# Patient Record
Sex: FEMALE | Race: WHITE | NOT HISPANIC OR LATINO | Employment: FULL TIME
[De-identification: names, ages, dates, MRNs, and addresses within clinical notes are randomized per-mention and may not be internally consistent; named-entity substitution may affect disease eponyms.]

---

## 2017-06-10 ENCOUNTER — HEALTH MAINTENANCE LETTER (OUTPATIENT)
Age: 62
End: 2017-06-10

## 2017-10-20 ENCOUNTER — HOSPITAL ENCOUNTER (OUTPATIENT)
Dept: MAMMOGRAPHY | Facility: CLINIC | Age: 62
Discharge: HOME | End: 2017-10-20
Attending: FAMILY MEDICINE | Admitting: FAMILY MEDICINE
Payer: COMMERCIAL

## 2017-10-20 DIAGNOSIS — Z12.31 VISIT FOR SCREENING MAMMOGRAM: ICD-10-CM

## 2017-10-20 PROCEDURE — G0202 SCR MAMMO BI INCL CAD: HCPCS

## 2018-11-14 ENCOUNTER — HOSPITAL ENCOUNTER (OUTPATIENT)
Dept: MAMMOGRAPHY | Facility: CLINIC | Age: 63
Discharge: HOME | End: 2018-11-14
Attending: FAMILY MEDICINE | Admitting: FAMILY MEDICINE
Payer: COMMERCIAL

## 2018-11-14 DIAGNOSIS — Z12.31 VISIT FOR SCREENING MAMMOGRAM: ICD-10-CM

## 2018-11-14 PROCEDURE — 77063 BREAST TOMOSYNTHESIS BI: CPT

## 2019-11-26 ENCOUNTER — HOSPITAL ENCOUNTER (OUTPATIENT)
Dept: MAMMOGRAPHY | Facility: CLINIC | Age: 64
Discharge: HOME | End: 2019-11-26
Attending: FAMILY MEDICINE | Admitting: FAMILY MEDICINE
Payer: COMMERCIAL

## 2019-11-26 DIAGNOSIS — Z12.31 OTHER SCREENING MAMMOGRAM: ICD-10-CM

## 2019-11-26 PROCEDURE — 77063 BREAST TOMOSYNTHESIS BI: CPT

## 2020-11-18 ENCOUNTER — HOSPITAL ENCOUNTER (OUTPATIENT)
Dept: MAMMOGRAPHY | Facility: CLINIC | Age: 65
Discharge: HOME | End: 2020-11-18
Attending: FAMILY MEDICINE | Admitting: FAMILY MEDICINE
Payer: COMMERCIAL

## 2020-11-18 DIAGNOSIS — Z12.31 VISIT FOR SCREENING MAMMOGRAM: ICD-10-CM

## 2020-11-18 PROCEDURE — 77067 SCR MAMMO BI INCL CAD: CPT

## 2021-01-15 ENCOUNTER — HEALTH MAINTENANCE LETTER (OUTPATIENT)
Age: 66
End: 2021-01-15

## 2021-04-16 ENCOUNTER — TRANSFERRED RECORDS (OUTPATIENT)
Dept: HEALTH INFORMATION MANAGEMENT | Facility: CLINIC | Age: 66
End: 2021-04-16

## 2021-04-16 LAB — HBA1C MFR BLD: 6.2 % (ref 4.8–5.6)

## 2021-05-09 ENCOUNTER — HEALTH MAINTENANCE LETTER (OUTPATIENT)
Age: 66
End: 2021-05-09

## 2021-05-11 ENCOUNTER — OFFICE VISIT (OUTPATIENT)
Dept: PHARMACY | Facility: PHYSICIAN GROUP | Age: 66
End: 2021-05-11
Payer: COMMERCIAL

## 2021-05-11 DIAGNOSIS — H04.123 DRY EYES: ICD-10-CM

## 2021-05-11 DIAGNOSIS — J30.9 ALLERGIC RHINITIS, UNSPECIFIED SEASONALITY, UNSPECIFIED TRIGGER: ICD-10-CM

## 2021-05-11 DIAGNOSIS — M85.80 OSTEOPENIA, UNSPECIFIED LOCATION: ICD-10-CM

## 2021-05-11 DIAGNOSIS — Z78.9 TAKES DIETARY SUPPLEMENTS: ICD-10-CM

## 2021-05-11 DIAGNOSIS — G43.709 CHRONIC MIGRAINE WITHOUT AURA WITHOUT STATUS MIGRAINOSUS, NOT INTRACTABLE: ICD-10-CM

## 2021-05-11 DIAGNOSIS — E11.9 TYPE 2 DIABETES MELLITUS WITHOUT COMPLICATION, WITHOUT LONG-TERM CURRENT USE OF INSULIN (H): Primary | ICD-10-CM

## 2021-05-11 DIAGNOSIS — E78.5 DYSLIPIDEMIA: ICD-10-CM

## 2021-05-11 DIAGNOSIS — F34.1 DYSTHYMIC DISORDER: ICD-10-CM

## 2021-05-11 PROCEDURE — 99605 MTMS BY PHARM NP 15 MIN: CPT | Performed by: PHARMACIST

## 2021-05-11 PROCEDURE — 99607 MTMS BY PHARM ADDL 15 MIN: CPT | Performed by: PHARMACIST

## 2021-05-11 RX ORDER — CHLORAL HYDRATE 500 MG
1 CAPSULE ORAL DAILY
COMMUNITY

## 2021-05-11 RX ORDER — ATORVASTATIN CALCIUM 10 MG/1
10 TABLET, FILM COATED ORAL DAILY
COMMUNITY

## 2021-05-11 RX ORDER — FEXOFENADINE HCL AND PSEUDOEPHEDRINE HCI 60; 120 MG/1; MG/1
1 TABLET, EXTENDED RELEASE ORAL DAILY PRN
COMMUNITY

## 2021-05-11 RX ORDER — MULTIVIT-MIN/IRON/FOLIC ACID/K 18-600-40
1 CAPSULE ORAL DAILY
COMMUNITY

## 2021-05-11 RX ORDER — ENALAPRIL MALEATE 2.5 MG/1
2.5 TABLET ORAL DAILY
COMMUNITY

## 2021-05-11 RX ORDER — FLASH GLUCOSE SENSOR
KIT MISCELLANEOUS
COMMUNITY

## 2021-05-11 RX ORDER — BLOOD-GLUCOSE METER
EACH MISCELLANEOUS
COMMUNITY

## 2021-05-11 RX ORDER — ASPIRIN 81 MG/1
81 TABLET ORAL DAILY
COMMUNITY

## 2021-05-11 RX ORDER — AZELASTINE 1 MG/ML
1 SPRAY, METERED NASAL 2 TIMES DAILY PRN
COMMUNITY

## 2021-05-11 RX ORDER — SERTRALINE HYDROCHLORIDE 100 MG/1
100 TABLET, FILM COATED ORAL DAILY
COMMUNITY

## 2021-05-11 ASSESSMENT — MIFFLIN-ST. JEOR: SCORE: 1011.97

## 2021-05-11 NOTE — PROGRESS NOTES
"Medication Therapy Management (MTM) Encounter    ASSESSMENT:                            Medication Adherence/Access: {adherencechoices:840247}    ***: ***  ***: ***  ***: ***  ***: ***    PLAN:                            1. Stop vitamin D.     Follow-up: No follow-ups on file.      SUBJECTIVE/OBJECTIVE:                          Tabitha Nieves is a 66 year old female { :864058} for {mtmvisit:365846}     Reason for visit: ***.    Allergies/ADRs: {1/2/3/4/5:351247}  Tobacco: She has no history on file for tobacco.  Alcohol: {ALCOHOL CONSUMPTION HX:698843}  Caffeine: {CAFFEINE INTAKE:224218}  Activity: ***  Past Medical History: {1/2/3/4/5:506197}  {Social and Goals:632343}    Medication Adherence/Access: {fumedadherence:653055}    Type 2 Diabetes:  Currently taking metformin 500mg daily. {sideeffects:906425}  Blood sugar monitoring: {MTM self monitorin}. Ranges {Pt report:109363}: {bgranges:957993}  AM fastin,  93, 101, 103, 90, 103, 91, 100, 113, 100, 96, 105, 101, 107, 99.   Before dinner: 104, 143, 125, 135, 111, 161 (brownie)  Symptoms of low blood sugar? {HYPOGLYCEMIA SYMPTOMS:903110::\"none\"}  Symptoms of high blood sugar? {diabetessymptoms:974375}  Eye exam: {up to date:878218}  Foot exam: {up to date:349779}  Diet/Exercise: ***  Aspirin: {ASAyesno:231305}  Statin: {YES (EXPLAIN)/NO:273423}   ACEi/ARB: {YES (EXPLAIN)/NO:273097}.   Urine Albumin: No results found for: UMALCR   No results found for: A1C  {IMMUNIZATION REMINDER LOOK IN NAVIGATOR:557420}    Hyperlipidemia: Current therapy includes {LIPID TREATMENT:161470}.  Patient reports {mtmlipidsideeffect:882662}  {lipidascvd:985859}  No results for input(s): CHOL, HDL, LDL, TRIG, CHOLHDLRATIO in the last 51415 hours.    Depression:  Current medications include: Sertraline 100mg daily. {mtmdepassess:259108}  No flowsheet data found.     Dry Eyes:     Chronic Migraine without Aura: Current preventive medications include: Aimovig 140mg weekly. " "  Current abortive medications include: {MigraineAbortiveMeds:702198}. Patient's triggers include: {MigraineTriggers:799871} and associated symptoms include: {MigraineSx:664732}. Patient reports having *** headache days per month. Current non-pharmacologic treatments include: ***. Current side effects include: ***. Patient has failed the following preventive medications: {MigrainePreventionMeds:943555} and the following abortive medications: {MigraineAbortiveMeds:967475}. Used to have migraines 2-3 times weekly, now has migraines once weekly. Hard to distinguish from migraine and sinus headache. Follows with allergist Dr. Fisher. Light and sound sensitivity    Today's Vitals: There were no vitals taken for this visit.     ----------------    I spent *** minutes with this patient today{MTMpartdbillingquestion:019732}. { :519640}. A copy of the visit note was provided to the patient's {[facility] chart:159624} provider.    The patient {GIVEN/NOT GIVEN:121767::\"was given\"} a summary of these recommendations. {covisit:869010}    ***    Telemedicine Visit Details  Type of service:  [facility]2695::\"Telephone visit\"}  Start Time: {video/phone visit start time:152948}  End Time: {video/phone visit end time:152948}  Originating Location (patient location): Home  Distant Location (provider location):  [facility]    {Click at end of visit to add-in MTP:979255}      "

## 2021-05-11 NOTE — PROGRESS NOTES
Medication Therapy Management (MTM) Encounter    ASSESSMENT:                            Medication Adherence/Access: No issues identified    Type 2 Diabetes: Patient is meeting A1c goal of < 7%. Self monitoring of blood glucose is at goal of fasting  mg/dL and post prandial < 180 mg/dL. Patient may benefit from continuous glucose monitoring. Placed a Freestyle David 14-day sensor sample today. Set up the [employer] mabel on her cell phone and connect to our [employer] practice. Provided teaching today and answered all her questions.    Hyperlipidemia: Stable. Patient is on moderate intensity statin which is indicated based on 2019 ACC/AHA guidelines for lipid management.      Depression:  Stable.    Dry Eyes: Stable.    Migraine/Allergies: Improved. Follow-up plan in place with allergist.    Osteopenia: Evidence suggests high-dose vitamin D and levels >50 ng/mL may increase fracture risk. Patient has been on high-dose vitamin D for about 10 years without a trial of a lower maintenance dose. Patient may benefit from stopping high-dose vitamin D and rechecking level in 3-6 months. Her multivitamin contains an adequate maintenance dose of vitamin D.    Supplements: Stable.    PLAN:                            1. Started Freestyle David 14-day sensor in clinic today. Sent order to pharmacy for refills. Patient will check to see if it's covered by insurance.    2. Stop vitamin D 50,000 international unit(s) weekly. Continue multivitamin containing vitamin D 1,000 international unit(s) daily. Recheck vitamin D level in 3-6 months.    Follow-up: Return in about 1 month (around 6/11/2021).   *Patient declines scheduling appointment. She will call me as needed with questions or concerns.    SUBJECTIVE/OBJECTIVE:                          Tabitha Nieves is a 66 year old female coming in for an initial visit. She was referred to me from Sherice Gee PA-C.      Reason for visit: Try Freestyle David. Patient also brings in all  her medications bottles for review today.    Allergies/ADRs: Reviewed in chart  Tobacco: She reports that she has never smoked. She has never used smokeless tobacco.  Alcohol: unknown  Past Medical History: Reviewed in chart    Medication Adherence/Access: no issues reported    Type 2 Diabetes:  Currently taking metformin 500mg daily. Patient is not experiencing side effects.  Blood sugar monitorin-2 time(s) daily. Ranges (from patient's glucose log): See below  AM fastin,  93, 101, 103, 90, 103, 91, 100, 113, 100, 96, 105, 101, 107, 99.   Before dinner: 104, 143, 125, 135, 111, 161 (brownie).  Her fingers are calloused and sore from testing over the years, wants to try Freestyle David because of this.  Symptoms of low blood sugar? none  Symptoms of high blood sugar? none  Diet/Exercise: Did not discuss today.  Aspirin: Taking 81mg daily and denies side effects  Statin: Taking atorvastatin 10mg daily.   ACEi/ARB: Taking enalapril 2.5mg daily. Last microalbumin was <30 mg/g on 21. History of microalbumin <30 mg/g in  per chart review.  Last A1c was 6.2% on 21.  Last CMP on 21: sCr 0.77 mg/dL, eGFR 81 mL/min, and electrolytes WNL.    Hyperlipidemia: Current therapy includes atorvastatin 10mg daily.  Patient reports no significant myalgias or other side effects.  Last lipid panel on 21: , trigs 66, HDL 79, and LDL 72.    Depression:  Current medications include: Sertraline 100mg daily. She's been on this medication for many years, feels it's helpful and prefers to stay on it. No concerns today.     Dry Eyes: Patient is currently taking fish oil 1000mg daily and Systane eye drops twice daily. Patient feels current therapy is effective and reports no side effects.    Migraine/Allergies: Current medications include: Aimovig 140mg monthly, azelastine 0.1% nasal spray 1 spray both nostrils 1-2 times daily, and Allegra-D daily as needed. Has been on Aimovig for about a year. Used to  "have migraines 2-3 times weekly, now has migraines once weekly. It's hard for her to distinguish between migraine and sinus headache. Endorses light and sound sensitivity, denies any aura symptoms. Patient feels current therapy is effective and reports no side effects. Follows with allergist Dr. Fisher.     Osteopenia: Current therapy includes: vitamin D3 50,000 international unit(s) weekly. She's been on this dose for many years, since 2011 per chart review. Vitamin D level at that time was 28 ng/mL. Her multivitamin also contains vitamin D3 1000 international unit(s) daily.   DEXA History: 12/11/19   T - Score Lumbar Spine: -1.0 (no significant change in BMD since 2017)  T- Score Femoral Neck: -2.4 (6.3% decline in BMD since 2017)  T-score Overall Hip: -1.9 (4.8% decline in BMD since 2017)  Patient is getting the following sources of dietary calcium: did not discuss today.  Risk factors: post-menopausal  Last vitamin D level was 75 ng/mL on 10/26/20.    Supplements: Patient is currently taking vitamin C 500mg daily (for immune health) and multivitamin daily. No concerns today.    Today's Vitals: /78   Pulse 91   Ht 5' 1.75\" (1.568 m)   Wt 114 lb 2.1 oz (51.8 kg)   BMI 21.04 kg/m       ----------------    I spent 60 minutes with this patient today. All changes were made via collaborative practice agreement with Sherice Gee PA-C. A copy of the visit note was provided to the patient's primary care provider.    The patient was sent via clinic portal a summary of these recommendations.     Karyna Darden, PharmD  Medication Therapy Management Pharmacist  Pager: [phone]        Medication Therapy Recommendations  Osteopenia, unspecified location    Rationale: Dose too high - Dosage too high - Safety   Recommendation: Decrease Dose - Vitamin D3 25 mcg (1000 units) tablet - Take 1 tablet by mouth daily   Status: Accepted per CPA         Type 2 diabetes mellitus without complication, without long-term current " use of insulin (H)    Current Medication: Contour Next EZ (CONTOUR NEXT EZ W/DEVICE KIT) w/Device KIT   Rationale: Patient prefers not to take - Adherence - Adherence   Recommendation: Change Medication - FreeStyle David 14 Day Sensor Misc - Change every 14 days   Status: Accepted per CPA

## 2021-05-12 VITALS
WEIGHT: 114.13 LBS | DIASTOLIC BLOOD PRESSURE: 78 MMHG | HEIGHT: 62 IN | BODY MASS INDEX: 21 KG/M2 | SYSTOLIC BLOOD PRESSURE: 126 MMHG | HEART RATE: 91 BPM

## 2021-05-12 NOTE — PATIENT INSTRUCTIONS
Recommendations from today's MTM visit:                                                    MTM (medication therapy management) is a service provided by a clinical pharmacist designed to help you get the most of out of your medicines.   Today we reviewed what your medicines are for, how to know if they are working, that your medicines are safe and how to make your medicine regimen as easy as possible.      1. Started Freestyle David 14-day sensor in clinic today. Sent order to pharmacy for refills so you can check to see if it's covered by insurance.    2. Stop vitamin D 50,000 international unit(s) weekly. Continue multivitamin containing vitamin D 1,000 international unit(s) daily. Recheck vitamin D level in 3-6 months.    Follow-up: Return in about 1 month (around 6/11/2021).    It was great to speak with you today.  I value your experience and would be very thankful for your time with providing feedback on our clinic survey. You may receive a survey via email or text message in the next few days.     To schedule another MTM appointment, please call the clinic directly or you may call the MTM scheduling line at [phone] or toll-free at [phone].     My Clinical Pharmacist's contact information:                                                      Please feel free to contact me with any questions or concerns you have.      Karyna Darden, PharmD  Medication Therapy Management Pharmacist  Pager: [phone]

## 2021-08-29 ENCOUNTER — HEALTH MAINTENANCE LETTER (OUTPATIENT)
Age: 66
End: 2021-08-29

## 2021-10-24 ENCOUNTER — HEALTH MAINTENANCE LETTER (OUTPATIENT)
Age: 66
End: 2021-10-24

## 2021-10-27 ENCOUNTER — HOSPITAL ENCOUNTER (OUTPATIENT)
Dept: MAMMOGRAPHY | Facility: CLINIC | Age: 66
Discharge: HOME | End: 2021-10-27
Attending: PHYSICIAN ASSISTANT | Admitting: PHYSICIAN ASSISTANT
Payer: COMMERCIAL

## 2021-10-27 ENCOUNTER — TRANSFERRED RECORDS (OUTPATIENT)
Dept: HEALTH INFORMATION MANAGEMENT | Facility: CLINIC | Age: 66
End: 2021-10-27

## 2021-10-27 DIAGNOSIS — Z12.31 VISIT FOR SCREENING MAMMOGRAM: ICD-10-CM

## 2021-10-27 LAB — HBA1C MFR BLD: 6.3 % (ref 4.8–5.6)

## 2021-10-27 PROCEDURE — 77063 BREAST TOMOSYNTHESIS BI: CPT

## 2022-02-13 ENCOUNTER — HEALTH MAINTENANCE LETTER (OUTPATIENT)
Age: 67
End: 2022-02-13

## 2022-06-05 ENCOUNTER — HEALTH MAINTENANCE LETTER (OUTPATIENT)
Age: 67
End: 2022-06-05

## 2022-10-15 ENCOUNTER — HEALTH MAINTENANCE LETTER (OUTPATIENT)
Age: 67
End: 2022-10-15

## 2022-11-11 ENCOUNTER — HOSPITAL ENCOUNTER (OUTPATIENT)
Dept: MAMMOGRAPHY | Facility: CLINIC | Age: 67
Discharge: HOME | End: 2022-11-11
Attending: PHYSICIAN ASSISTANT | Admitting: PHYSICIAN ASSISTANT
Payer: COMMERCIAL

## 2022-11-11 DIAGNOSIS — Z12.31 VISIT FOR SCREENING MAMMOGRAM: ICD-10-CM

## 2022-11-11 PROCEDURE — 77067 SCR MAMMO BI INCL CAD: CPT

## 2023-03-26 ENCOUNTER — HEALTH MAINTENANCE LETTER (OUTPATIENT)
Age: 68
End: 2023-03-26

## 2023-06-11 ENCOUNTER — HEALTH MAINTENANCE LETTER (OUTPATIENT)
Age: 68
End: 2023-06-11

## 2023-08-20 ENCOUNTER — HEALTH MAINTENANCE LETTER (OUTPATIENT)
Age: 68
End: 2023-08-20

## 2023-10-30 ENCOUNTER — HOSPITAL ENCOUNTER (OUTPATIENT)
Dept: MAMMOGRAPHY | Facility: CLINIC | Age: 68
Discharge: HOME | End: 2023-10-30
Attending: PHYSICIAN ASSISTANT | Admitting: PHYSICIAN ASSISTANT
Payer: COMMERCIAL

## 2023-10-30 DIAGNOSIS — Z12.31 VISIT FOR SCREENING MAMMOGRAM: ICD-10-CM

## 2023-10-30 PROCEDURE — 77067 SCR MAMMO BI INCL CAD: CPT

## 2024-01-07 ENCOUNTER — HEALTH MAINTENANCE LETTER (OUTPATIENT)
Age: 69
End: 2024-01-07

## 2024-08-04 ENCOUNTER — HEALTH MAINTENANCE LETTER (OUTPATIENT)
Age: 69
End: 2024-08-04

## 2024-10-28 ENCOUNTER — HOSPITAL ENCOUNTER (OUTPATIENT)
Dept: MAMMOGRAPHY | Facility: CLINIC | Age: 69
Discharge: HOME | End: 2024-10-28
Attending: PHYSICIAN ASSISTANT | Admitting: PHYSICIAN ASSISTANT
Payer: COMMERCIAL

## 2024-10-28 DIAGNOSIS — Z12.31 VISIT FOR SCREENING MAMMOGRAM: ICD-10-CM

## 2024-10-28 PROCEDURE — 77063 BREAST TOMOSYNTHESIS BI: CPT

## 2025-08-16 ENCOUNTER — HEALTH MAINTENANCE LETTER (OUTPATIENT)
Age: 70
End: 2025-08-16